# Patient Record
Sex: FEMALE | URBAN - METROPOLITAN AREA
[De-identification: names, ages, dates, MRNs, and addresses within clinical notes are randomized per-mention and may not be internally consistent; named-entity substitution may affect disease eponyms.]

---

## 2022-11-01 ENCOUNTER — OFFICE VISIT (OUTPATIENT)
Dept: URBAN - METROPOLITAN AREA CLINIC 23 | Facility: CLINIC | Age: 67
End: 2022-11-01
Payer: COMMERCIAL

## 2022-11-01 DIAGNOSIS — H40.033 ANATOMICAL NARROW ANGLE, BILATERAL: Primary | ICD-10-CM

## 2022-11-01 PROCEDURE — 99203 OFFICE O/P NEW LOW 30 MIN: CPT | Performed by: STUDENT IN AN ORGANIZED HEALTH CARE EDUCATION/TRAINING PROGRAM

## 2022-11-01 PROCEDURE — 92133 CPTRZD OPH DX IMG PST SGM ON: CPT | Performed by: STUDENT IN AN ORGANIZED HEALTH CARE EDUCATION/TRAINING PROGRAM

## 2022-11-01 PROCEDURE — 92020 GONIOSCOPY: CPT | Performed by: STUDENT IN AN ORGANIZED HEALTH CARE EDUCATION/TRAINING PROGRAM

## 2022-11-01 ASSESSMENT — INTRAOCULAR PRESSURE
OD: 15
OS: 15

## 2022-11-01 ASSESSMENT — KERATOMETRY
OD: 43.50
OS: 44.13

## 2022-11-01 NOTE — IMPRESSION/PLAN
Impression: Anatomical narrow angle, bilateral: H40.033.
-- referred by Dr Jaylen Pena
-- NAG suspect
-- (+)fhx of glc, sister recently dx Plan: Narrow on VH & bdln on gonioscopy IOP today reasonable @15 OU OCT RNFL x 11/1/22
   101/89, wnl 360 OU with large DA Pt ed findings and concern for NAG. Will refer for LPI consult next available. Discussed si/sx of angle closure and to seek care asap if noted.

## 2022-11-11 ENCOUNTER — OFFICE VISIT (OUTPATIENT)
Dept: URBAN - METROPOLITAN AREA CLINIC 28 | Facility: CLINIC | Age: 67
End: 2022-11-11
Payer: COMMERCIAL

## 2022-11-11 DIAGNOSIS — H25.13 AGE-RELATED NUCLEAR CATARACT, BILATERAL: ICD-10-CM

## 2022-11-11 DIAGNOSIS — H40.033 ANATOMICAL NARROW ANGLE, BILATERAL: Primary | ICD-10-CM

## 2022-11-11 PROCEDURE — 99204 OFFICE O/P NEW MOD 45 MIN: CPT | Performed by: OPHTHALMOLOGY

## 2022-11-11 PROCEDURE — 76514 ECHO EXAM OF EYE THICKNESS: CPT | Performed by: OPHTHALMOLOGY

## 2022-11-11 PROCEDURE — 92133 CPTRZD OPH DX IMG PST SGM ON: CPT | Performed by: OPHTHALMOLOGY

## 2022-11-11 PROCEDURE — 92020 GONIOSCOPY: CPT | Performed by: OPHTHALMOLOGY

## 2022-11-11 RX ORDER — PREDNISOLONE ACETATE 10 MG/ML
1 % SUSPENSION/ DROPS OPHTHALMIC
Qty: 5 | Refills: 0 | Status: ACTIVE
Start: 2022-11-11

## 2022-11-11 ASSESSMENT — INTRAOCULAR PRESSURE
OS: 17
OD: 18

## 2022-11-11 NOTE — IMPRESSION/PLAN
Impression: Anatomical narrow angle, bilateral: H40.033. CCT average OU Plan: Discussed diagnosis, at risk for AACG, IOP/ONH/Glaucoma management and risks, explained and understood. OCT ordered and reviewed today. Recommend LPI OU to prevent a sudden attack of glaucoma. Advised patient to avoid certain medications that might dilate the pupils until LPI is done. Discussed RBA's and laser procedure. Patient elects LPI OU. RL=2.  Start prednisolone qid x 7 days after laser

## 2023-02-07 ENCOUNTER — POST-OPERATIVE VISIT (OUTPATIENT)
Dept: URBAN - METROPOLITAN AREA CLINIC 28 | Facility: CLINIC | Age: 68
End: 2023-02-07
Payer: COMMERCIAL

## 2023-02-07 DIAGNOSIS — Z48.810 ENCOUNTER FOR SURGICAL AFTERCARE FOLLOWING SURGERY ON A SENSE ORGAN: Primary | ICD-10-CM

## 2023-02-07 PROCEDURE — 99024 POSTOP FOLLOW-UP VISIT: CPT | Performed by: OPHTHALMOLOGY

## 2023-02-07 ASSESSMENT — INTRAOCULAR PRESSURE
OS: 16
OD: 17

## 2023-02-07 NOTE — IMPRESSION/PLAN
Impression: S/P LPI (Laser Peripheral Iridotomy) OU - 11 Days. Encounter for surgical aftercare following surgery on a sense organ  Z48.810. Plan: Discussed diagnosis, explained and understood by patient. Discussed IOP/ONH/Glaucoma management and risks. No glaucoma treatment recommended OU. Can return with referring O.D. for yearly exams. Safe to use medications that can dilate eyes. Continue to monitor condition and symptoms. Reconsult PRN with Dr. Angle Oquendo.

## 2024-02-08 ENCOUNTER — OFFICE VISIT (OUTPATIENT)
Dept: URBAN - METROPOLITAN AREA CLINIC 24 | Facility: CLINIC | Age: 69
End: 2024-02-08
Payer: COMMERCIAL

## 2024-02-08 DIAGNOSIS — H43.392 OTHER VITREOUS OPACITIES, LEFT EYE: ICD-10-CM

## 2024-02-08 DIAGNOSIS — H52.4 PRESBYOPIA: ICD-10-CM

## 2024-02-08 DIAGNOSIS — H25.813 COMBINED FORMS OF AGE-RELATED CATARACT, BILATERAL: Primary | ICD-10-CM

## 2024-02-08 DIAGNOSIS — H40.033 ANATOMICAL NARROW ANGLE, BILATERAL: ICD-10-CM

## 2024-02-08 PROCEDURE — 92133 CPTRZD OPH DX IMG PST SGM ON: CPT | Performed by: STUDENT IN AN ORGANIZED HEALTH CARE EDUCATION/TRAINING PROGRAM

## 2024-02-08 PROCEDURE — 99214 OFFICE O/P EST MOD 30 MIN: CPT | Performed by: STUDENT IN AN ORGANIZED HEALTH CARE EDUCATION/TRAINING PROGRAM

## 2024-02-08 ASSESSMENT — VISUAL ACUITY
OD: 20/20
OS: 20/20

## 2024-02-08 ASSESSMENT — INTRAOCULAR PRESSURE
OS: 13
OD: 13

## 2024-02-08 ASSESSMENT — KERATOMETRY
OD: 43.58
OS: 43.92

## 2025-05-02 ENCOUNTER — OFFICE VISIT (OUTPATIENT)
Dept: URBAN - METROPOLITAN AREA CLINIC 40 | Facility: CLINIC | Age: 70
End: 2025-05-02
Payer: COMMERCIAL

## 2025-05-02 DIAGNOSIS — H43.392 OTHER VITREOUS OPACITIES, LEFT EYE: ICD-10-CM

## 2025-05-02 DIAGNOSIS — H40.013 OPEN ANGLE WITH BORDERLINE FINDINGS, LOW RISK, BILATERAL: ICD-10-CM

## 2025-05-02 DIAGNOSIS — H40.033 ANATOMICAL NARROW ANGLE, BILATERAL: Primary | ICD-10-CM

## 2025-05-02 DIAGNOSIS — H25.813 COMBINED FORMS OF AGE-RELATED CATARACT, BILATERAL: ICD-10-CM

## 2025-05-02 PROCEDURE — 99214 OFFICE O/P EST MOD 30 MIN: CPT | Performed by: OPTOMETRIST

## 2025-05-02 ASSESSMENT — INTRAOCULAR PRESSURE
OD: 15
OS: 15

## 2025-05-02 ASSESSMENT — KERATOMETRY
OS: 44.25
OD: 43.50